# Patient Record
Sex: FEMALE | Race: WHITE | Employment: FULL TIME | URBAN - METROPOLITAN AREA
[De-identification: names, ages, dates, MRNs, and addresses within clinical notes are randomized per-mention and may not be internally consistent; named-entity substitution may affect disease eponyms.]

---

## 2017-01-09 ENCOUNTER — HOSPITAL ENCOUNTER (EMERGENCY)
Facility: HOSPITAL | Age: 22
Discharge: HOME | End: 2017-01-09
Attending: EMERGENCY MEDICINE

## 2017-01-09 ENCOUNTER — HOSPITAL ENCOUNTER (OUTPATIENT)
Age: 22
Discharge: ANOTHER HOSPITAL | End: 2017-01-09

## 2017-01-09 VITALS
HEART RATE: 90 BPM | SYSTOLIC BLOOD PRESSURE: 122 MMHG | OXYGEN SATURATION: 99 % | WEIGHT: 125 LBS | TEMPERATURE: 99 F | RESPIRATION RATE: 18 BRPM | BODY MASS INDEX: 23.6 KG/M2 | HEIGHT: 61 IN | DIASTOLIC BLOOD PRESSURE: 78 MMHG

## 2017-01-09 VITALS
DIASTOLIC BLOOD PRESSURE: 86 MMHG | OXYGEN SATURATION: 100 % | HEART RATE: 76 BPM | HEIGHT: 61 IN | WEIGHT: 130 LBS | BODY MASS INDEX: 24.55 KG/M2 | RESPIRATION RATE: 18 BRPM | SYSTOLIC BLOOD PRESSURE: 128 MMHG

## 2017-01-09 DIAGNOSIS — K04.7 DENTAL ABSCESS: Primary | ICD-10-CM

## 2017-01-09 DIAGNOSIS — K02.9 DENTAL CARIES: ICD-10-CM

## 2017-01-09 DIAGNOSIS — L03.211 FACIAL CELLULITIS: ICD-10-CM

## 2017-01-09 DIAGNOSIS — L02.01 FACIAL ABSCESS: Primary | ICD-10-CM

## 2017-01-09 PROCEDURE — 99205 OFFICE O/P NEW HI 60 MIN: CPT

## 2017-01-09 PROCEDURE — 99284 EMERGENCY DEPT VISIT MOD MDM: CPT

## 2017-01-09 PROCEDURE — 96375 TX/PRO/DX INJ NEW DRUG ADDON: CPT

## 2017-01-09 PROCEDURE — 96365 THER/PROPH/DIAG IV INF INIT: CPT

## 2017-01-09 PROCEDURE — 41800 DRAINAGE OF GUM LESION: CPT

## 2017-01-09 RX ORDER — CLINDAMYCIN HYDROCHLORIDE 300 MG/1
300 CAPSULE ORAL 3 TIMES DAILY
Qty: 30 CAPSULE | Refills: 0 | Status: SHIPPED | OUTPATIENT
Start: 2017-01-09 | End: 2017-01-19

## 2017-01-09 RX ORDER — HYDROMORPHONE HYDROCHLORIDE 1 MG/ML
0.5 INJECTION, SOLUTION INTRAMUSCULAR; INTRAVENOUS; SUBCUTANEOUS ONCE
Status: COMPLETED | OUTPATIENT
Start: 2017-01-09 | End: 2017-01-09

## 2017-01-09 RX ORDER — HYDROCODONE BITARTRATE AND ACETAMINOPHEN 5; 325 MG/1; MG/1
1-2 TABLET ORAL EVERY 4 HOURS PRN
Qty: 16 TABLET | Refills: 0 | Status: SHIPPED | OUTPATIENT
Start: 2017-01-09 | End: 2017-01-16

## 2017-01-09 NOTE — ED NOTES
Pt to go directly to [facility] ED. Is aware nothing to eat or drink.   Report called to Mary

## 2017-01-09 NOTE — ED PROVIDER NOTES
Patient Seen in: [facility]    History   Patient presents with:  Abscess (integumentary)    Stated Complaint: RT SIDE FACE SWOLLEN,TOOTH ABSCESS    HPI    Aury Spencer is a 55-year-old female who presents today for evaluation of righ Constitutional: She appears well-developed and well-nourished. She appears distressed. Patient is actively crying due to pain. HENT:   Head: Atraumatic.        Right Ear: Hearing, tympanic membrane, external ear and ear canal normal.   Left Ear: Hearing Plan: Due to the location of this abscess and the degree of pain the patient is experiencing I believe the patient will be better served in the emergency department, possibly to be evaluated by ENT.   Dr. Marnie Herrera evaluates the patient and agrees with the lary

## 2017-01-09 NOTE — ED PROVIDER NOTES
Patient Seen in: [facility] Emergency Department    History   Patient presents with:  Abscess (integumentary)    Stated Complaint: dental abscess    HPI    CHIEF COMPLAINT: Dental abscess    HISTORY OF PRESENT ILLNESS: Patient is a 24-year-old femal Smoking Status: Never Smoker                        Review of Systems    Positive for stated complaint: dental abscess  Other systems are as noted in HPI. Constitutional and vital signs reviewed.       All other systems reviewed and negative except as no Abscess drainage: The patient abscess was located right mandible. I obtained verbal consent from the patient to drain the abscess who was informed about the possibility of bleeding, pain and worsening of the condition.  Area was anesthetized with 1% lido

## 2017-01-09 NOTE — CM/SW NOTE
Emergency Department Discharge Plan  Patient provided contact info for [facility] dental clinic, along with [facility], [place] [address], and [address] [place] [address] dental clinic. [address].

## 2017-01-09 NOTE — ED PROVIDER NOTES
I reviewed that chart and discussed the case with the physician assistant. I have examined the patient and noted patient has a small dental abscess. This was opened and drained and antibiotics were started. Patient will be referred to a dental clinic. Bayron [place]

## 2017-01-09 NOTE — ED INITIAL ASSESSMENT (HPI)
Right side facial swelling from infected tooth, pain and swelling just started yesterday, denies nausea/fevers, \"just a lot of pain\", last Ibuprofen 600mg at 0900

## 2017-01-09 NOTE — ED INITIAL ASSESSMENT (HPI)
Pt here with right sided facial edema onset yesterday. Pt stating she knows she has an issue with one of her teeth on right lower side.  Has not and does not see a dentist.

## (undated) NOTE — ED AVS SNAPSHOT
[facility] Emergency Department    [place]  [address] [place] [address]    Phone:  [phone]    Fax:  [phone]           Kelsi Castro   MRN: [id number]    Department:  [facility] Emergency Department   Date of Visit:  1/9/2 swallowing, changes in her voice or phonation, drooling. Followup with your dentist in one to 2 days for reevaluation and further care    Ibuprofen 600 mg every 6 hours as needed for pain control.   Take Norco 1-2 tabs every 4-6 hours as needed for break visit does not uncover every injury or illness.  If you have been referred to a primary care or a specialist physician for a follow-up visit, please tell this physician (or your personal doctor if your instructions are to return to your personal doctor) abo [place] [address] [facility] [address]) [phone]                Additional Information       We are concerned for your overall well being:    - If you are a smoker or have smoked in the last 12 months, we encourage you to explore options for [facility]

## (undated) NOTE — ED AVS SNAPSHOT
[facility] in [address] [place] [address] Drive,[address]    [address] [place] [address]    Phone:  [phone]    Fax:  [phone]           Joaquina Flores   MRN: [id number]    Department:  [facility]   Date of Visit:  1/9/2017 nuestro adminstrador de frances dowd [phone]. Expect to receive an electronic request (by e-mail or text) to complete a self-assessment the day after your visit. You may also receive a call from our patient liason soon after your visit.  Also, some [place] [address] [address]  ([address]) [address] [place] [facility] [phone] [address] [facility] ([address] [place] [address]) [phone] [address] [place] [id number] ( not sign up before the expiration date, you must request a new code. Your unique [employer] Access Code: [email or web address]  Expires: 3/10/2017 11:30 AM    If you have questions, you can call [phone] to talk to our [facility] Staff.  Remember, [employer]

## (undated) NOTE — ED AVS SNAPSHOT
[facility] Emergency Department    [place]  [address] [place] [address]    Phone:  [phone]    Fax:  [phone]           Willard Praful   MRN: [id number]    Department:  [facility] Emergency Department   Date of Visit:  1/9/2 IF THERE IS ANY CHANGE OR WORSENING OF YOUR CONDITION, CALL YOUR PRIMARY CARE PHYSICIAN AT ONCE OR RETURN IMMEDIATELY TO THE EMERGENCY DEPARTMENT.     If you have been prescribed any medication(s), please fill your prescription right away and begin taking t